# Patient Record
Sex: FEMALE | Race: WHITE
[De-identification: names, ages, dates, MRNs, and addresses within clinical notes are randomized per-mention and may not be internally consistent; named-entity substitution may affect disease eponyms.]

---

## 2023-11-21 PROBLEM — Z00.00 ENCOUNTER FOR PREVENTIVE HEALTH EXAMINATION: Status: ACTIVE | Noted: 2023-11-21

## 2023-11-22 ENCOUNTER — APPOINTMENT (OUTPATIENT)
Dept: UROLOGY | Facility: CLINIC | Age: 28
End: 2023-11-22
Payer: COMMERCIAL

## 2023-11-22 VITALS
OXYGEN SATURATION: 97 % | WEIGHT: 130 LBS | DIASTOLIC BLOOD PRESSURE: 70 MMHG | RESPIRATION RATE: 16 BRPM | HEART RATE: 70 BPM | SYSTOLIC BLOOD PRESSURE: 109 MMHG | BODY MASS INDEX: 19.26 KG/M2 | HEIGHT: 69 IN

## 2023-11-22 DIAGNOSIS — Z78.9 OTHER SPECIFIED HEALTH STATUS: ICD-10-CM

## 2023-11-22 DIAGNOSIS — N32.81 OVERACTIVE BLADDER: ICD-10-CM

## 2023-11-22 DIAGNOSIS — L70.9 ACNE, UNSPECIFIED: ICD-10-CM

## 2023-11-22 PROCEDURE — 99204 OFFICE O/P NEW MOD 45 MIN: CPT | Mod: 25

## 2023-11-22 PROCEDURE — 51798 US URINE CAPACITY MEASURE: CPT

## 2023-11-24 LAB
APPEARANCE: CLEAR
BACTERIA UR CULT: NORMAL
BACTERIA: NEGATIVE /HPF
BILIRUBIN URINE: NEGATIVE
BLOOD URINE: NEGATIVE
CAST: 0 /LPF
COLOR: YELLOW
EPITHELIAL CELLS: 5 /HPF
GLUCOSE QUALITATIVE U: NEGATIVE MG/DL
KETONES URINE: NEGATIVE MG/DL
LEUKOCYTE ESTERASE URINE: ABNORMAL
MICROSCOPIC-UA: NORMAL
NITRITE URINE: NEGATIVE
PH URINE: 6.5
PROTEIN URINE: NEGATIVE MG/DL
RED BLOOD CELLS URINE: 0 /HPF
SPECIFIC GRAVITY URINE: 1.02
UROBILINOGEN URINE: 1 MG/DL
WHITE BLOOD CELLS URINE: 3 /HPF

## 2024-02-23 ENCOUNTER — TRANSCRIPTION ENCOUNTER (OUTPATIENT)
Age: 29
End: 2024-02-23

## 2024-02-23 ENCOUNTER — APPOINTMENT (OUTPATIENT)
Dept: UROLOGY | Facility: CLINIC | Age: 29
End: 2024-02-23
Payer: COMMERCIAL

## 2024-02-23 VITALS — SYSTOLIC BLOOD PRESSURE: 102 MMHG | DIASTOLIC BLOOD PRESSURE: 66 MMHG | TEMPERATURE: 98.7 F | HEART RATE: 64 BPM

## 2024-02-23 PROCEDURE — 51798 US URINE CAPACITY MEASURE: CPT

## 2024-02-23 PROCEDURE — 99213 OFFICE O/P EST LOW 20 MIN: CPT | Mod: 25

## 2024-02-23 NOTE — LETTER BODY
[Dear  ___] : Dear  [unfilled], [Courtesy Letter:] : I had the pleasure of seeing your patient, [unfilled], in my office today. [Please see my note below.] : Please see my note below. [Consult Closing:] : Thank you very much for allowing me to participate in the care of this patient.  If you have any questions, please do not hesitate to contact me. [Sincerely,] : Sincerely, [FreeTextEntry3] : Tabatha Hammonds MD Director of Robotic Education [facility] at [facility], [place]   [email or web address] [phone]

## 2024-02-23 NOTE — ASSESSMENT
[FreeTextEntry1] : 28 year old with OAB-wet. OAB is a clinical diagnosis characterized by the presence of bothersome urinary symptoms. OAB is a symptom complex with a variable and chronic course that needs to be managed over time, that it primarily affects QOL, that there is no single ideal treatment and that available treatments vary in the effort required from the patient as well as in invasiveness, risk of adverse events and reversibility. First line treatment is behavioral modifications: bladder training with pelvic floor physical therapy, fluid management, caffeine reduction, dietary changes (avoiding bladder irritants).Next line oral anti-muscarinics or oral 3-adrenoceptor agonists. Third line PTNS and neuromodulation. Fourth line more invasive surgical treatment.   Started mirabegron last visit - tolerating well with significant improvement in symptoms. Can attempt to wean off over time once does pelvic floor PT.   - pelvic floor PT - avoidance bladder triggers - avoid constipation - continue mirabegron  - pelvic floor PT - fu 6 months

## 2024-02-23 NOTE — HISTORY OF PRESENT ILLNESS
[FreeTextEntry1] : Name FEROZ MONSON MRN [id number]  1995 Contact Number: [phone] ----------------------------------------------------------------------------------------------------------------------------------------- Date of First Visit: 23 Referring Provider/PCP: Dr. Rebecca Kiser f: [phone] -----------------------------------------------------------------------------------------------------------------------------------------  CC: urinary frequency, incontinence  History of Present Illness: FEROZ MONSON is a 28 year old female who presents for evaluation of urinary incontinence since 2016. Saw a urologist 5 years ago and was put on oxybutynin but caused constipation, so stopped. Oxybutynin did help. Patient reports urinary frequency hourly, urgency, a few times a month patient won't make it to bathroom in time - full amount. Frequency not every day - worse with periods. No associated pain. Feels like empties bladder completely. No nocturia. Good stream, no hesitancy, no intermittency.  No history of recurrent UTIs or history of STIs. No history of hematuria  Bladder triggers: caffeine 1 cup a day, carbonation daily, rare citrus, a lot of spicy foods, no tomatoes, no pineapple, social alcohol, no smoking  Comorbid conditions: no neurologic diseases, no mobility deficits. Reports constipation - bms about 4 times a week. Takes spironolactone for acne - has been on for 1.5 years and no change in symptoms    PVR (to ensure adequate emptying) 0 ---------------------------------------------------------------------------------------------------------------------------------------- Interval History (2024):  Patient started mirabegron. Reports no more constipation - stopped seltzer and spicy foods, still drinking coffee. On wait list for PT. No retention. No UTIs. NO dry mouth or dry eyes - tolerating well. Symptoms significantly improved. No more frequency, urgency, incontinence.   PVR (to ensure adequate emptying): 0 ---------------------------------------------------------------------------------------------------------------------------------------- PMH: none PSH: rhinoplasty Family History: no  malignancies, breast cancer grandmother >55 years old Social: single, no children, , never smoker, social alcohol, no recreational drugs Meds: multivitamin, junel fe, spironolactone Allergies: NKDA ROS: no fevers, chills, flank pain

## 2024-04-02 RX ORDER — MIRABEGRON 25 MG/1
25 TABLET, FILM COATED, EXTENDED RELEASE ORAL
Qty: 90 | Refills: 3 | Status: ACTIVE | COMMUNITY
Start: 2023-11-22 | End: 1900-01-01

## 2024-08-23 ENCOUNTER — APPOINTMENT (OUTPATIENT)
Dept: UROLOGY | Facility: CLINIC | Age: 29
End: 2024-08-23